# Patient Record
Sex: MALE | Race: WHITE | NOT HISPANIC OR LATINO | URBAN - METROPOLITAN AREA
[De-identification: names, ages, dates, MRNs, and addresses within clinical notes are randomized per-mention and may not be internally consistent; named-entity substitution may affect disease eponyms.]

---

## 2017-01-12 ENCOUNTER — OUTPATIENT (OUTPATIENT)
Dept: OUTPATIENT SERVICES | Facility: HOSPITAL | Age: 32
LOS: 1 days | End: 2017-01-12
Payer: SELF-PAY

## 2017-01-12 ENCOUNTER — APPOINTMENT (OUTPATIENT)
Dept: NEUROSURGERY | Facility: CLINIC | Age: 32
End: 2017-01-12

## 2017-01-12 DIAGNOSIS — M54.16 RADICULOPATHY, LUMBAR REGION: ICD-10-CM

## 2017-01-12 PROCEDURE — 64483 NJX AA&/STRD TFRM EPI L/S 1: CPT

## 2017-02-21 ENCOUNTER — RX RENEWAL (OUTPATIENT)
Age: 32
End: 2017-02-21

## 2017-02-23 ENCOUNTER — MEDICATION RENEWAL (OUTPATIENT)
Age: 32
End: 2017-02-23

## 2017-04-25 ENCOUNTER — MEDICATION RENEWAL (OUTPATIENT)
Age: 32
End: 2017-04-25

## 2017-06-26 ENCOUNTER — MEDICATION RENEWAL (OUTPATIENT)
Age: 32
End: 2017-06-26

## 2018-03-05 ENCOUNTER — RX RENEWAL (OUTPATIENT)
Age: 33
End: 2018-03-05

## 2018-06-07 ENCOUNTER — MEDICATION RENEWAL (OUTPATIENT)
Age: 33
End: 2018-06-07

## 2018-06-07 ENCOUNTER — RX RENEWAL (OUTPATIENT)
Age: 33
End: 2018-06-07

## 2018-07-05 ENCOUNTER — MEDICATION RENEWAL (OUTPATIENT)
Age: 33
End: 2018-07-05

## 2018-08-03 ENCOUNTER — RX RENEWAL (OUTPATIENT)
Age: 33
End: 2018-08-03

## 2018-09-19 ENCOUNTER — RX RENEWAL (OUTPATIENT)
Age: 33
End: 2018-09-19

## 2018-10-23 ENCOUNTER — RX RENEWAL (OUTPATIENT)
Age: 33
End: 2018-10-23

## 2018-12-24 ENCOUNTER — RX RENEWAL (OUTPATIENT)
Age: 33
End: 2018-12-24

## 2019-01-23 ENCOUNTER — RX RENEWAL (OUTPATIENT)
Age: 34
End: 2019-01-23

## 2019-02-25 ENCOUNTER — RX RENEWAL (OUTPATIENT)
Age: 34
End: 2019-02-25

## 2019-03-27 ENCOUNTER — RX RENEWAL (OUTPATIENT)
Age: 34
End: 2019-03-27

## 2019-04-26 ENCOUNTER — MEDICATION RENEWAL (OUTPATIENT)
Age: 34
End: 2019-04-26

## 2019-05-23 ENCOUNTER — RX RENEWAL (OUTPATIENT)
Age: 34
End: 2019-05-23

## 2019-05-24 ENCOUNTER — RX RENEWAL (OUTPATIENT)
Age: 34
End: 2019-05-24

## 2019-05-28 ENCOUNTER — RX RENEWAL (OUTPATIENT)
Age: 34
End: 2019-05-28

## 2019-06-24 ENCOUNTER — MEDICATION RENEWAL (OUTPATIENT)
Age: 34
End: 2019-06-24

## 2019-06-24 ENCOUNTER — RX RENEWAL (OUTPATIENT)
Age: 34
End: 2019-06-24

## 2019-07-31 ENCOUNTER — RX RENEWAL (OUTPATIENT)
Age: 34
End: 2019-07-31

## 2019-08-20 ENCOUNTER — APPOINTMENT (OUTPATIENT)
Dept: NEUROSURGERY | Facility: CLINIC | Age: 34
End: 2019-08-20
Payer: COMMERCIAL

## 2019-08-20 VITALS
WEIGHT: 200 LBS | BODY MASS INDEX: 30.31 KG/M2 | HEIGHT: 68 IN | HEART RATE: 81 BPM | SYSTOLIC BLOOD PRESSURE: 141 MMHG | DIASTOLIC BLOOD PRESSURE: 91 MMHG

## 2019-08-20 DIAGNOSIS — M54.16 RADICULOPATHY, LUMBAR REGION: ICD-10-CM

## 2019-08-20 DIAGNOSIS — M54.5 LOW BACK PAIN: ICD-10-CM

## 2019-08-20 PROCEDURE — 99213 OFFICE O/P EST LOW 20 MIN: CPT

## 2019-08-20 NOTE — ASSESSMENT
[FreeTextEntry1] : 33 year old male with low back and lumbar radicular pain well controlled.  He will continue with his Flexeril as prescribed.  He will return to see me at the earliest sign that his pain worsens for further intervention as necessary.

## 2019-08-20 NOTE — REASON FOR VISIT
[FreeTextEntry1] : Patient returns after being seen a few years ago.  He states his pain is well controlled with Flexeril.  His last epidural injection was a few years ago.  He is doing well.  No side effects reported. [Follow-Up: _____] : a [unfilled] follow-up visit

## 2020-01-21 ENCOUNTER — RX RENEWAL (OUTPATIENT)
Age: 35
End: 2020-01-21

## 2025-01-10 ENCOUNTER — NON-APPOINTMENT (OUTPATIENT)
Age: 40
End: 2025-01-10